# Patient Record
Sex: MALE | Race: WHITE | Employment: FULL TIME | URBAN - METROPOLITAN AREA
[De-identification: names, ages, dates, MRNs, and addresses within clinical notes are randomized per-mention and may not be internally consistent; named-entity substitution may affect disease eponyms.]

---

## 2017-03-31 PROCEDURE — 86901 BLOOD TYPING SEROLOGIC RH(D): CPT | Performed by: FAMILY MEDICINE

## 2017-03-31 PROCEDURE — 36415 COLL VENOUS BLD VENIPUNCTURE: CPT | Performed by: FAMILY MEDICINE

## 2017-03-31 PROCEDURE — 86900 BLOOD TYPING SEROLOGIC ABO: CPT | Performed by: FAMILY MEDICINE

## 2020-02-13 PROBLEM — Z72.89 ALCOHOL USE: Status: ACTIVE | Noted: 2020-02-13

## 2020-02-13 PROBLEM — E55.9 VITAMIN D DEFICIENCY: Status: ACTIVE | Noted: 2020-02-13

## 2020-02-13 PROBLEM — K44.9 HIATAL HERNIA: Status: ACTIVE | Noted: 2020-02-13

## 2020-02-13 PROBLEM — F17.290 CIGAR SMOKER: Status: ACTIVE | Noted: 2020-02-13

## 2020-02-13 PROBLEM — K21.9 GASTROESOPHAGEAL REFLUX DISEASE WITHOUT ESOPHAGITIS: Status: ACTIVE | Noted: 2020-02-13

## 2020-02-20 PROBLEM — R03.0 ELEVATED BP WITHOUT DIAGNOSIS OF HYPERTENSION: Status: ACTIVE | Noted: 2020-02-20

## 2020-02-27 PROBLEM — I10 ESSENTIAL HYPERTENSION: Status: ACTIVE | Noted: 2020-02-27

## 2020-02-27 PROBLEM — R94.31 ABNORMAL EKG: Status: ACTIVE | Noted: 2020-02-27

## 2020-10-09 PROCEDURE — 88305 TISSUE EXAM BY PATHOLOGIST: CPT | Performed by: INTERNAL MEDICINE

## 2021-10-15 PROBLEM — R03.0 ELEVATED BP WITHOUT DIAGNOSIS OF HYPERTENSION: Status: RESOLVED | Noted: 2020-02-20 | Resolved: 2021-10-15

## 2021-12-01 ENCOUNTER — OFFICE VISIT (OUTPATIENT)
Dept: WOUND CARE | Facility: HOSPITAL | Age: 46
End: 2021-12-01
Attending: INTERNAL MEDICINE
Payer: COMMERCIAL

## 2021-12-01 VITALS
BODY MASS INDEX: 41.47 KG/M2 | SYSTOLIC BLOOD PRESSURE: 150 MMHG | DIASTOLIC BLOOD PRESSURE: 90 MMHG | RESPIRATION RATE: 16 BRPM | HEIGHT: 69 IN | HEART RATE: 102 BPM | TEMPERATURE: 98 F | WEIGHT: 280 LBS

## 2021-12-01 DIAGNOSIS — I10 ESSENTIAL HYPERTENSION: ICD-10-CM

## 2021-12-01 DIAGNOSIS — S61.401A: Primary | ICD-10-CM

## 2021-12-01 PROCEDURE — 11042 DBRDMT SUBQ TIS 1ST 20SQCM/<: CPT | Performed by: INTERNAL MEDICINE

## 2021-12-01 PROCEDURE — 99215 OFFICE O/P EST HI 40 MIN: CPT

## 2021-12-01 NOTE — PROGRESS NOTES
[address] CONSULTATION NOTE  Leif Darnell MD  12/1/2021    Esequiel Martinez is a 55year old male. Patient presents with:  Wound Care: Inital visit Right hand. Ericka Javed and caught hand on dresser on saterday night.  Patient has bee Grandfather 76        prostate     Allergies:  No Known Allergies  Current Meds:  Current Outpatient Medications   Medication Sig Dispense Refill   • Cephalexin 500 MG Oral Tab Take 500 mg by mouth 3 (three) times daily for 10 days.  30 tablet 0   • APPLE C 1059   Non-staged Wound Description Full thickness 12/01/21 1059   Debbie-wound Assessment Edema 12/01/21 1059   Wound Granulation Tissue Red;Spongy 12/01/21 1059   Wound Bed Granulation (%) 50 % 12/01/21 1059   Wound Bed Slough (%) 50 % 12/01/21 1059   Woun 11:20 AM  Immediately prior to the procedure a time out was called  Performed by: provider  Debridement type: surgical  Level of debridement: subcutaneous tissue  Pain control: lidocaine 4%  Pre-debridement measurements  Length (cm): 0.6  Width (cm): 4  De orange or yellow vegetables, cantaloupe, fortified dairy products, liver, fortified cereals    • Zinc: Fortified cereals, red meats, seafood    • Consider [place] [facility] (These are essential branch chain amino acids that help with tissue building and with the patient's other providers. Followup: Return in about 1 week (around 12/8/2021) for Wound followup.     Comment: Please note this report has been produced using speech recognition software and may contain errors related to that system including e

## 2021-12-01 NOTE — PROGRESS NOTES
Patient ID: Jorje Lynch is a 55year old male. Debridement   Wound 12/01/21 #1 Right Palm Traumatic Hand Anterior;Right    Consent obtained? verbal  Consent given by: patient  Risks discussed?  procedural risks discussed  Time out called at 12/1/202

## 2021-12-01 NOTE — PATIENT INSTRUCTIONS
Patient Instructions and orders for Wound Care      Wound Cleaning and Dressings:    • Wash your hands with soap and water. Always wear gloves while changing dressings. Donot touch wound / ignacio-wound skin with un-gloved hands.  Remove old dressing, discard please call your primary physician or go to the nearest emergency room.

## 2021-12-08 ENCOUNTER — OFFICE VISIT (OUTPATIENT)
Dept: WOUND CARE | Facility: HOSPITAL | Age: 46
End: 2021-12-08
Attending: INTERNAL MEDICINE
Payer: COMMERCIAL

## 2021-12-08 VITALS
HEART RATE: 85 BPM | SYSTOLIC BLOOD PRESSURE: 138 MMHG | RESPIRATION RATE: 16 BRPM | DIASTOLIC BLOOD PRESSURE: 94 MMHG | TEMPERATURE: 98 F

## 2021-12-08 DIAGNOSIS — I10 ESSENTIAL HYPERTENSION: ICD-10-CM

## 2021-12-08 DIAGNOSIS — S61.401A: Primary | ICD-10-CM

## 2021-12-08 PROCEDURE — 99214 OFFICE O/P EST MOD 30 MIN: CPT

## 2021-12-08 PROCEDURE — 97597 DBRDMT OPN WND 1ST 20 CM/<: CPT | Performed by: INTERNAL MEDICINE

## 2021-12-08 NOTE — PROGRESS NOTES
[facility] NOTE  Emani Valdivia MD  12/8/2021    Chief Complaint:   Patient presents with:  Wound Care: Follow up for right hand wound. No concerns at this time.        HPI:   Baldo Smith is a 55year old male coming in Tissue Pink;Spongy 12/08/21 1403   Wound Bed Granulation (%) 25 % 12/08/21 1403   Wound Bed Slough (%) 75 % 12/08/21 1403   Wound Odor None 12/08/21 1403           ASSESSMENT AND PLAN:     Assessment   Assessment    Encounter Diagnosis  Open wound of hand trash.       · Cleanse wound with saline   · Apply honey gel on wound base.    · Secure with gauze and tape  · Wear spandagrip E compression to hold dressing in place and decrease swelling and movement of wound bed.      Miscellaneous Instructions:  · Suppl questions. Outcome: Patient verbalizes understanding. Patient is notified to call with any questions, complications, allergies, or worsening or changing symptoms.   Patient is to call with any side effects or complications as a result of the treatments t

## 2021-12-08 NOTE — PROGRESS NOTES
Patient ID: Giacomo Mason is a 55year old male. Debridement   Wound 12/01/21 #1 Right Palm Traumatic Hand Anterior;Right    Consent obtained? verbal  Consent given by: patient  Risks discussed?  procedural risks discussed  Time out called at 12/8/202

## 2021-12-22 ENCOUNTER — OFFICE VISIT (OUTPATIENT)
Dept: WOUND CARE | Facility: HOSPITAL | Age: 46
End: 2021-12-22
Attending: INTERNAL MEDICINE
Payer: COMMERCIAL

## 2021-12-22 VITALS
SYSTOLIC BLOOD PRESSURE: 148 MMHG | TEMPERATURE: 98 F | RESPIRATION RATE: 18 BRPM | HEART RATE: 89 BPM | DIASTOLIC BLOOD PRESSURE: 98 MMHG

## 2021-12-22 DIAGNOSIS — S61.401A: Primary | ICD-10-CM

## 2021-12-22 PROCEDURE — 99213 OFFICE O/P EST LOW 20 MIN: CPT

## 2021-12-22 NOTE — PROGRESS NOTES
[facility] NOTE  Rebecca Ruiz MD  12/22/2021    Chief Complaint:   Patient presents with:  Wound Care: Patient is here for a follow up of right palm wound.       HPI:   Roland Sheffield is a 55year old male coming in for Wound Bed Epithelium (%) 100 % 12/22/21 1415   Wound Bed Slough (%) 75 % 12/08/21 1403   Wound Odor None 12/22/21 1415           ASSESSMENT AND PLAN:     Assessment   Assessment    Encounter Diagnosis  Open wound of hand without complication, right, init contain errors related to that system including errors in grammar, punctuation, and spelling, as well as words and phrases that may be inappropriate.  If there are any questions or concerns please feel free to contact the dictating provider for clarificatio